# Patient Record
Sex: FEMALE | Race: WHITE | URBAN - METROPOLITAN AREA
[De-identification: names, ages, dates, MRNs, and addresses within clinical notes are randomized per-mention and may not be internally consistent; named-entity substitution may affect disease eponyms.]

---

## 2020-11-23 ENCOUNTER — OFFICE VISIT (OUTPATIENT)
Dept: ORTHOPEDIC SURGERY | Age: 60
End: 2020-11-23
Payer: COMMERCIAL

## 2020-11-23 VITALS — BODY MASS INDEX: 24.55 KG/M2 | TEMPERATURE: 97.3 F | WEIGHT: 130 LBS | HEIGHT: 61 IN

## 2020-11-23 PROCEDURE — 20610 DRAIN/INJ JOINT/BURSA W/O US: CPT | Performed by: ORTHOPAEDIC SURGERY

## 2020-11-23 PROCEDURE — 99243 OFF/OP CNSLTJ NEW/EST LOW 30: CPT | Performed by: ORTHOPAEDIC SURGERY

## 2020-11-23 RX ORDER — METHYLPREDNISOLONE ACETATE 40 MG/ML
80 INJECTION, SUSPENSION INTRA-ARTICULAR; INTRALESIONAL; INTRAMUSCULAR; SOFT TISSUE ONCE
Status: COMPLETED | OUTPATIENT
Start: 2020-11-23 | End: 2020-11-23

## 2020-11-23 RX ORDER — MELOXICAM 15 MG/1
15 TABLET ORAL DAILY
Qty: 30 TABLET | Refills: 3 | Status: SHIPPED | OUTPATIENT
Start: 2020-11-23

## 2020-11-23 RX ADMIN — METHYLPREDNISOLONE ACETATE 80 MG: 40 INJECTION, SUSPENSION INTRA-ARTICULAR; INTRALESIONAL; INTRAMUSCULAR; SOFT TISSUE at 08:53

## 2020-11-23 NOTE — PROGRESS NOTES
Chief Complaint    Knee Pain (right knee)      History of Present Illness: Johnny Lechuga is a 61 y.o. female. She is here today for consultation for her right knee at the referral of Dr. Rosemarie Michelle. She has had right knee pain that is been going on now for close to a month. She states the pain came on after she was exercising at the gym primarily doing walking around the track with some friends. She noticed getting back into her car that day that she was having a lot of difficulty putting any type of weight on her right knee. The pain in her knee has been very persistent has been primarily anterior medial in nature. She does not notice any significant swelling noted about the knee. She denies radicular pain. Denies numbness or tingling. Has not been taking much as far as anti-inflammatories. Denies any past history of problems with this knee    She has had x-rays and MRI of the knee that demonstrates primarily patellofemoral arthritis, there is no evidence of meniscus tear on her MRI           Medical History:  Patient's medications, allergies, past medical, surgical, social and family histories were reviewed and updated as appropriate. Review of Systems:  Pertinent items are noted in HPI  Review of systems reviewed from Patient History Form dated on 11/23/20 and available in the patient's chart under the Media tab. Vital Signs:  Temp 97.3 °F (36.3 °C)   Ht 5' 1\" (1.549 m)   Wt 130 lb (59 kg)   BMI 24.56 kg/m²     General Exam:   Constitutional: Patient is adequately groomed with no evidence of malnutrition  DTRs: Deep tendon reflexes are intact  Mental Status: The patient is oriented to time, place and person. The patient's mood and affect are appropriate. Knee Examination:    Inspection: No significant swelling erythema noted but the right knee today    Palpation: No palpable effusion. She does have tenderness primarily along the medial joint line. No lateral joint line tenderness.